# Patient Record
Sex: FEMALE | Race: WHITE
[De-identification: names, ages, dates, MRNs, and addresses within clinical notes are randomized per-mention and may not be internally consistent; named-entity substitution may affect disease eponyms.]

---

## 2019-07-28 ENCOUNTER — HOSPITAL ENCOUNTER (EMERGENCY)
Dept: HOSPITAL 12 - ER | Age: 29
Discharge: HOME | End: 2019-07-28
Payer: SELF-PAY

## 2019-07-28 VITALS — HEIGHT: 65 IN | BODY MASS INDEX: 19.99 KG/M2 | WEIGHT: 120 LBS

## 2019-07-28 VITALS — SYSTOLIC BLOOD PRESSURE: 124 MMHG | DIASTOLIC BLOOD PRESSURE: 71 MMHG
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
## 2019-07-28 DIAGNOSIS — S06.0X0A: Primary | ICD-10-CM

## 2019-07-28 DIAGNOSIS — S13.4XXA: ICD-10-CM
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
## 2019-07-28 LAB — HCG UR QL: NEGATIVE

## 2019-07-28 PROCEDURE — A4663 DIALYSIS BLOOD PRESSURE CUFF: HCPCS

## 2019-07-28 PROCEDURE — A9150 MISC/EXPER NON-PRESCRIPT DRU: HCPCS

## 2019-07-28 PROCEDURE — 84703 CHORIONIC GONADOTROPIN ASSAY: CPT

## 2019-07-28 PROCEDURE — 96372 THER/PROPH/DIAG INJ SC/IM: CPT

## 2019-07-28 PROCEDURE — 70450 CT HEAD/BRAIN W/O DYE: CPT

## 2019-07-28 PROCEDURE — 99284 EMERGENCY DEPT VISIT MOD MDM: CPT

## 2019-07-28 NOTE — NUR
Patient discharged to home in stable conditon.  Written and verbal after care 
instructions given. 

Patient verbalizes understanding of instructions. pateint alert and oriented 
x4. Patient self ambulatory. patients  at bedside and stated he will be 
driving her home. Patients belongings and exit care package taken home with 
patient. Patient has good understanding of medication prescription and overall 
health.

## 2019-07-28 NOTE — NUR
patient in room. Patient complain of head pain that radiate to the neck after a 
fall that occured earlier today at a [place]. Patient states her pain is at 
a 10 and radiated to her neck. patient denies any vision changes. Patient is 
alert and oriented x4. No vomiting at this time and complains feeling a bit 
nausous. patient describes the head pain to be throbbing and pressure like. No 
bleeding or brusing noted on the left side of the face.